# Patient Record
Sex: FEMALE | Race: WHITE | Employment: UNEMPLOYED | URBAN - METROPOLITAN AREA
[De-identification: names, ages, dates, MRNs, and addresses within clinical notes are randomized per-mention and may not be internally consistent; named-entity substitution may affect disease eponyms.]

---

## 2020-08-13 ENCOUNTER — HOSPITAL ENCOUNTER (EMERGENCY)
Facility: CLINIC | Age: 35
Discharge: HOME | End: 2020-08-14
Attending: EMERGENCY MEDICINE | Admitting: EMERGENCY MEDICINE
Payer: COMMERCIAL

## 2020-08-13 DIAGNOSIS — M62.81 GENERALIZED MUSCLE WEAKNESS: ICD-10-CM

## 2020-08-13 DIAGNOSIS — R06.02 SHORTNESS OF BREATH: ICD-10-CM

## 2020-08-13 DIAGNOSIS — Z20.822 SUSPECTED COVID-19 VIRUS INFECTION: ICD-10-CM

## 2020-08-13 DIAGNOSIS — R00.2 PALPITATIONS: ICD-10-CM

## 2020-08-13 LAB
BASOPHILS # BLD AUTO: 0.1 10E9/L (ref 0–0.2)
BASOPHILS NFR BLD AUTO: 0.5 %
DIFFERENTIAL METHOD BLD: ABNORMAL
EOSINOPHIL # BLD AUTO: 0.3 10E9/L (ref 0–0.7)
EOSINOPHIL NFR BLD AUTO: 2.3 %
ERYTHROCYTE [DISTWIDTH] IN BLOOD BY AUTOMATED COUNT: 13 % (ref 10–15)
HCT VFR BLD AUTO: 42.7 % (ref 35–47)
HGB BLD-MCNC: 13.9 G/DL (ref 11.7–15.7)
IMM GRANULOCYTES # BLD: 0 10E9/L (ref 0–0.4)
IMM GRANULOCYTES NFR BLD: 0.3 %
LYMPHOCYTES # BLD AUTO: 1.8 10E9/L (ref 0.8–5.3)
LYMPHOCYTES NFR BLD AUTO: 15.1 %
MCH RBC QN AUTO: 30.1 PG (ref 26.5–33)
MCHC RBC AUTO-ENTMCNC: 32.6 G/DL (ref 31.5–36.5)
MCV RBC AUTO: 92 FL (ref 78–100)
MONOCYTES # BLD AUTO: 0.7 10E9/L (ref 0–1.3)
MONOCYTES NFR BLD AUTO: 6.2 %
NEUTROPHILS # BLD AUTO: 8.8 10E9/L (ref 1.6–8.3)
NEUTROPHILS NFR BLD AUTO: 75.6 %
NRBC # BLD AUTO: 0 10*3/UL
NRBC BLD AUTO-RTO: 0 /100
PLATELET # BLD AUTO: 277 10E9/L (ref 150–450)
RBC # BLD AUTO: 4.62 10E12/L (ref 3.8–5.2)
WBC # BLD AUTO: 11.7 10E9/L (ref 4–11)

## 2020-08-13 PROCEDURE — 85025 COMPLETE CBC W/AUTO DIFF WBC: CPT | Performed by: EMERGENCY MEDICINE

## 2020-08-13 PROCEDURE — 85379 FIBRIN DEGRADATION QUANT: CPT | Performed by: EMERGENCY MEDICINE

## 2020-08-13 PROCEDURE — C9803 HOPD COVID-19 SPEC COLLECT: HCPCS

## 2020-08-13 PROCEDURE — 84703 CHORIONIC GONADOTROPIN ASSAY: CPT | Performed by: EMERGENCY MEDICINE

## 2020-08-13 PROCEDURE — 84484 ASSAY OF TROPONIN QUANT: CPT | Performed by: EMERGENCY MEDICINE

## 2020-08-13 PROCEDURE — 96374 THER/PROPH/DIAG INJ IV PUSH: CPT

## 2020-08-13 PROCEDURE — 80076 HEPATIC FUNCTION PANEL: CPT | Performed by: EMERGENCY MEDICINE

## 2020-08-13 PROCEDURE — 99285 EMERGENCY DEPT VISIT HI MDM: CPT | Mod: 25

## 2020-08-13 PROCEDURE — 80048 BASIC METABOLIC PNL TOTAL CA: CPT | Performed by: EMERGENCY MEDICINE

## 2020-08-13 PROCEDURE — 80320 DRUG SCREEN QUANTALCOHOLS: CPT | Performed by: EMERGENCY MEDICINE

## 2020-08-13 PROCEDURE — 93005 ELECTROCARDIOGRAM TRACING: CPT

## 2020-08-13 PROCEDURE — 96361 HYDRATE IV INFUSION ADD-ON: CPT

## 2020-08-13 PROCEDURE — 80329 ANALGESICS NON-OPIOID 1 OR 2: CPT | Performed by: EMERGENCY MEDICINE

## 2020-08-13 NOTE — ED AVS SNAPSHOT
[facility] Emergency Department  [address]  [facility] [address]  Phone:  [phone]  Fax:  [phone]                                    Kaykay Ndiaye   MRN: [id number]    Department:  [facility] Emergency Department   Date of Visit:  8/13/2020           After Visit Summary Signature Page    I have received my discharge instructions, and my questions have been answered. I have discussed any challenges I see with this plan with the nurse or doctor.    ..........................................................................................................................................  Patient/Patient Representative Signature      ..........................................................................................................................................  Patient Representative Print Name and Relationship to Patient    ..................................................               ................................................  Date                                   Time    ..........................................................................................................................................  Reviewed by Signature/Title    ...................................................              ..............................................  Date                                               Time          22EPIC Rev 08/18

## 2020-08-14 ENCOUNTER — APPOINTMENT (OUTPATIENT)
Dept: GENERAL RADIOLOGY | Facility: CLINIC | Age: 35
End: 2020-08-14
Attending: EMERGENCY MEDICINE
Payer: COMMERCIAL

## 2020-08-14 VITALS
RESPIRATION RATE: 18 BRPM | TEMPERATURE: 98.2 F | HEART RATE: 94 BPM | OXYGEN SATURATION: 99 % | SYSTOLIC BLOOD PRESSURE: 138 MMHG | DIASTOLIC BLOOD PRESSURE: 77 MMHG

## 2020-08-14 LAB
ALBUMIN SERPL-MCNC: 3.9 G/DL (ref 3.4–5)
ALBUMIN UR-MCNC: NEGATIVE MG/DL
ALP SERPL-CCNC: 98 U/L (ref 40–150)
ALT SERPL W P-5'-P-CCNC: 44 U/L (ref 0–50)
ANION GAP SERPL CALCULATED.3IONS-SCNC: 6 MMOL/L (ref 3–14)
APPEARANCE UR: CLEAR
AST SERPL W P-5'-P-CCNC: 32 U/L (ref 0–45)
BACTERIA #/AREA URNS HPF: ABNORMAL /HPF
BILIRUB DIRECT SERPL-MCNC: <0.1 MG/DL (ref 0–0.2)
BILIRUB SERPL-MCNC: 0.4 MG/DL (ref 0.2–1.3)
BILIRUB UR QL STRIP: NEGATIVE
BUN SERPL-MCNC: 13 MG/DL (ref 7–30)
CALCIUM SERPL-MCNC: 8.4 MG/DL (ref 8.5–10.1)
CHLORIDE SERPL-SCNC: 107 MMOL/L (ref 94–109)
CO2 SERPL-SCNC: 23 MMOL/L (ref 20–32)
COLOR UR AUTO: ABNORMAL
CREAT SERPL-MCNC: 0.56 MG/DL (ref 0.52–1.04)
D DIMER PPP FEU-MCNC: 0.3 UG/ML FEU (ref 0–0.5)
ETHANOL SERPL-MCNC: <0.01 G/DL
GFR SERPL CREATININE-BSD FRML MDRD: >90 ML/MIN/{1.73_M2}
GLUCOSE SERPL-MCNC: 105 MG/DL (ref 70–99)
GLUCOSE UR STRIP-MCNC: NEGATIVE MG/DL
HCG SERPL QL: NEGATIVE
HGB UR QL STRIP: ABNORMAL
INTERPRETATION ECG - MUSE: NORMAL
KETONES UR STRIP-MCNC: NEGATIVE MG/DL
LEUKOCYTE ESTERASE UR QL STRIP: ABNORMAL
MUCOUS THREADS #/AREA URNS LPF: PRESENT /LPF
NITRATE UR QL: NEGATIVE
PH UR STRIP: 5.5 PH (ref 5–7)
POTASSIUM SERPL-SCNC: 4.2 MMOL/L (ref 3.4–5.3)
PROT SERPL-MCNC: 7.2 G/DL (ref 6.8–8.8)
RBC #/AREA URNS AUTO: <1 /HPF (ref 0–2)
SALICYLATES SERPL-MCNC: <2 MG/DL
SODIUM SERPL-SCNC: 136 MMOL/L (ref 133–144)
SOURCE: ABNORMAL
SP GR UR STRIP: 1.02 (ref 1–1.03)
SQUAMOUS #/AREA URNS AUTO: 2 /HPF (ref 0–1)
TROPONIN I SERPL-MCNC: <0.015 UG/L (ref 0–0.04)
UROBILINOGEN UR STRIP-MCNC: NORMAL MG/DL (ref 0–2)
WBC #/AREA URNS AUTO: 1 /HPF (ref 0–5)

## 2020-08-14 PROCEDURE — 25800030 ZZH RX IP 258 OP 636: Performed by: EMERGENCY MEDICINE

## 2020-08-14 PROCEDURE — 81001 URINALYSIS AUTO W/SCOPE: CPT | Performed by: EMERGENCY MEDICINE

## 2020-08-14 PROCEDURE — 96361 HYDRATE IV INFUSION ADD-ON: CPT

## 2020-08-14 PROCEDURE — 25000128 H RX IP 250 OP 636: Performed by: EMERGENCY MEDICINE

## 2020-08-14 PROCEDURE — 71045 X-RAY EXAM CHEST 1 VIEW: CPT

## 2020-08-14 PROCEDURE — 96374 THER/PROPH/DIAG INJ IV PUSH: CPT

## 2020-08-14 PROCEDURE — U0003 INFECTIOUS AGENT DETECTION BY NUCLEIC ACID (DNA OR RNA); SEVERE ACUTE RESPIRATORY SYNDROME CORONAVIRUS 2 (SARS-COV-2) (CORONAVIRUS DISEASE [COVID-19]), AMPLIFIED PROBE TECHNIQUE, MAKING USE OF HIGH THROUGHPUT TECHNOLOGIES AS DESCRIBED BY CMS-2020-01-R: HCPCS | Performed by: EMERGENCY MEDICINE

## 2020-08-14 RX ORDER — LORAZEPAM 2 MG/ML
0.5 INJECTION INTRAMUSCULAR ONCE
Status: COMPLETED | OUTPATIENT
Start: 2020-08-14 | End: 2020-08-14

## 2020-08-14 RX ADMIN — SODIUM CHLORIDE 1000 ML: 9 INJECTION, SOLUTION INTRAVENOUS at 00:25

## 2020-08-14 RX ADMIN — LORAZEPAM 0.5 MG: 2 INJECTION INTRAMUSCULAR; INTRAVENOUS at 00:25

## 2020-08-14 RX ADMIN — SODIUM CHLORIDE 1000 ML: 9 INJECTION, SOLUTION INTRAVENOUS at 02:21

## 2020-08-14 ASSESSMENT — ENCOUNTER SYMPTOMS
DIFFICULTY URINATING: 0
DYSURIA: 0
DIARRHEA: 0
FATIGUE: 1
ABDOMINAL PAIN: 0
VOMITING: 0
NUMBNESS: 1
PALPITATIONS: 1
CHILLS: 1
COUGH: 1
FREQUENCY: 1
WEAKNESS: 1
HEADACHES: 1

## 2020-08-14 NOTE — DISCHARGE INSTRUCTIONS
Please monitor your symptoms closely    Push fluids to ensure hydration, separate your self from others, as we are awaiting results of COVID testing    Return to the ER with worsening shortness of breath, chest discomfort, fainting, fevers, or any other concerns.    Discharge Instructions  COVID-19    Your Provider has determined that you should practice self-isolation and self-monitoring in order to protect yourself and your community from COVID-19, which is the disease caused by a new coronavirus. The virus spreads from person to person primarily by droplets when an infected person coughs or sneezes and the droplet either lands on another person or that other person touches a surface with the droplet on it. Diagnosis of COVID-19 can be made with a test but many times the test is unavailable or not necessary. There is no specific treatment or medicine for the disease.    Symptoms of COVID-19    Many people have no symptoms or mild symptoms.  Symptoms may usually appear 4 to 5 days (up to 14 days) after contact with another ill person. Some people will get severe symptoms and pneumonia. Usual symptoms are:     ? Fever  ? Cough  ? Trouble breathing  ? Less common symptoms are: Headache, body aches, sore throat, sneezing, diarrhea, loss of taste or smell.    How to Care for Yourself Stay home.      Most people will recover from illness with mild symptoms.  Isolation by staying home is the best method to prevent the spread of the illness. Do not go to work or school. Have a friend or relative do your shopping. Do not use public transportation (bus, train) or ridesharing (Lyft, Uber).    How long should I stay home?    If you have symptoms of COVID, you should stay home for at least 10 days, and for 24 hours with no fever and improvement of symptoms--whichever is longer. (Your fever should be gone for 24 hours without using fever-reducing medicine)  For example, if you have a fever and cough for 6 days, you need to stay  home 4 more days with no fever for a total of 10 days. Or, if you have a fever and cough for 10 days, you need to stay home one more day with no fever for a total of 11 days.    Separate yourself from other people in your home.?As much as possible, you should stay in one room and away from other people in your home. Also, use a separate bathroom, if possible. Avoid handling pets or other animals while sick.     Wear a facemask if you need to be around other people and cover your mouth and nose with a tissue when you cough or sneeze.     Avoid sharing personal household items. You should not share dishes, drinking glasses, forks/knives/spoons, towels, or bedding with other people in your home. After using these items, they should be washed with soap and water. Clean parts of your home that are touched often (doorknobs, faucets, countertops, etc.) daily.     Wash your hands often with soap and water for at least 20 seconds or use an alcohol-based hand  containing at least 60% alcohol.     Avoid touching your face.    Treat your symptoms. You can take Acetaminophen (Tylenol) to treat body aches and fever as needed for comfort. Ibuprofen (Advil or Motrin) can be used as well if you still have symptoms after taking Tylenol. Drink fluids. Rest.    Watch for worsening symptoms such as shortness of breath/difficulty breathing or very severe weakness.    Employers/workplaces are being asked by the Centers for Disease Control (CDC) to not request notes/documentation for you to return to work or prove that you were ill. You may choose to show your employer this paperwork. Also, repeat testing should not be required to return to work.    Return to the Emergency Department if:    If you are developing worsening breathing, shortness of breath, or feel worse you should seek medical attention.  If you are uncertain, contact your health care provider/clinic. If you need emergency medical attention, call 911 and tell them  you have been ill.

## 2020-08-14 NOTE — ED PROVIDER NOTES
"  History     Chief Complaint:  Palpitations    The history is provided by the patient.      Kaykay Ndiaye is a 34 year old female who presents with palpitations that began today. The patient recently flew here from [place] to visit her mother, and had a few shots last night. She did not say how many. She felt \"off\" when she awoke this morning, noting weakness, headaches, and generalized fatigue. She thinks this was likely a hangover. She denies having had any withdrawal symptoms. The patient notes that her chest is not painful, but rather feels like she is having palpitations and her heart was racing. She did take 2 aspirin this morning and another two later on in the day, dose unknown. She has also tried taking deeper breaths to see if it would improve her symptoms. Tonight, both of her arms started becoming numb, and she came to the ED as her mother was concerned. Here, the patient endorses chills and intermittent cough, but denies any fevers, abdominal pain, vomiting, or diarrhea. She also notes urinary frequency, but denies any pain or burning with urination. The patient has recently been starting to take loratadine, but denies any other regular medications. She denies any personal history of heart problems, but does confirm family history in father. She has no history of clots, but mentions having leg cramps to her left calf. She denies any known sick contacts or COVID-19 exposures. She was tested for COVID-19 two weeks ago, which came back negative.    Allergies:  No Known Drug Allergies    Medications:    Loratidine    Past Medical History:    Tobacco dependence    Past Surgical History:    Appendectomy   section    Family History:    Father: Hypertension  Mother: Thyroid disease    Social History:  The patient was not accompanied to the ED.  Smoking Status: Not on file  Smokeless Tobacco: Not on file  Alcohol Use: Yes  Marital Status: Not on file    Review of Systems   Constitutional: Positive for " chills and fatigue.   Respiratory: Positive for cough (Intermittent).    Cardiovascular: Positive for palpitations. Negative for chest pain.   Gastrointestinal: Negative for abdominal pain, diarrhea and vomiting.   Genitourinary: Positive for frequency. Negative for difficulty urinating, dysuria and urgency.   Neurological: Positive for weakness, numbness and headaches. Seizures: Bilateral arms.   All other systems reviewed and are negative.    Physical Exam     Patient Vitals for the past 24 hrs:   BP Temp Pulse Heart Rate Resp SpO2   08/14/20 0319 138/77 -- 94 94 18 99 %   08/14/20 0200 (!) 146/71 -- 112 112 -- 98 %   08/14/20 0145 -- -- -- 98 -- 100 %   08/14/20 0130 (!) 134/91 -- 101 100 -- 100 %   08/14/20 0115 130/84 -- 99 95 -- 100 %   08/14/20 0100 (!) 143/86 -- 105 110 -- 100 %   08/14/20 0045 (!) 145/95 -- 105 116 -- 100 %   08/14/20 0030 (!) 146/97 -- 98 112 -- 100 %   08/14/20 0015 (!) 153/101 -- 113 115 -- 100 %   08/14/20 0000 (!) 154/101 -- 101 96 -- 100 %   08/13/20 2345 (!) 143/96 -- -- -- -- --   08/13/20 2139 -- -- -- -- 20 100 %   08/13/20 2137 (!) 140/103 98.2  F (36.8  C) 138 -- 24 --       Physical Exam  General:   Well-nourished   Speaking in full sentences  Eyes:   Conjunctiva without injection or scleral icterus  ENT:   Moist mucous membranes   Nares patent   Pinnae normal  Neck:   Full ROM   No stiffness appreciated  Resp:   Lungs CTAB   No crackles, wheezing or audible rubs   Good air movement  CV:    Tachycardic rate, regular rhythm   S1 and S2 present   No murmur, gallop or rub  GI:   BS present   Abdomen soft without distention   Non-tender to light and deep palpation   No guarding or rebound tenderness  Skin:   Warm, dry, well perfused   Coining noted to posterior thorax  MSK:   Moves all extremities   No focal deformities or swelling   No calf asymmetry  Neuro:   Alert   Answers questions appropriately   Moves all extremities equally   Gait stable  Psych:   Normal affect, normal  mood    Emergency Department Course     ECG:  Indication: Palpitations  Completed at 2144.   Sinus rhythm  Possible Left atrial enlargement  Borderline ECG  No previous ECGs available   Rate 100 bpm. MI interval 146. QRS duration 82. QT/QTc 346/446. P-R-T axes 67 82 37.  Agree with computer interpretation.    Imaging:  Radiology findings were communicated with the patient who voiced understanding of the findings.    XR Chest Port 1 View:  Negative chest.  Report per radiology.    Laboratory:  Laboratory findings were communicated with the patient who voiced understanding of the findings.    CBC: WBC 11.7 (H), HGB 13.9,   BMP: Glucose 105 (H), Calcium 8.4 (L), o/w WNL (Creatinine 0.56)    Hepatic panel: AWNL    D dimer: 0.3  (2338) Troponin: <0.015    Alcohol ethyl: <0.01    Salicylate level: <2    HCG Qualitative: negative    UA with micro: Trace blood, Trace Leukocyte Esterase, Few bacteria, Squamous Epithelial/HPF 2 (H), Mucous present, o/w negative    Urine Culture Aerobic Bacterial: Pending    COVID-19 Virus (Coronavirus) by Nasopharyngeal (NP) Swab: Pending    Interventions:  0025 NS 1L IV Bolus  0025 Ativan 0.5 mg IV    Emergency Department Course:  Past medical records, nursing notes, and vitals reviewed.    2357 I performed an exam of the patient as documented above.     IV was inserted and blood was drawn for laboratory testing, results above.    The patient was swabbed for COVID-19, noted above.    The patient provided a urine sample here in the emergency department. This was sent for laboratory testing, findings above.    The patient had a chest X-ray while in the emergency department, results above.     0300 I rechecked the patient and discussed the results of her workup thus far.     Findings and plan explained to the Patient. Patient discharged home with instructions regarding supportive care, medications, and reasons to return. The importance of close follow-up was reviewed.    I personally  reviewed the laboratory and imaging results with the Patient and answered all related questions prior to discharge.     Impression & Plan     Covid-19  Kaykay Ndiaye was evaluated during a global COVID-19 pandemic, which necessitated consideration that the patient might be at risk for infection with the SARS-CoV-2 virus that causes COVID-19.   Applicable protocols for evaluation were followed during the patient's care.   COVID-19 was considered as part of the patient's evaluation. A test was obtained during this visit.    Medical Decision Making:  Kaykay Ndiaye is a 34-year-old female presenting to the ED for evaluation of palpitations, and generalized weakness for the past day.  VS on presentation reveal elevated HR and BP, both of which improved during patient's ED course.  A broad differential was considered, including though was not limited to, infection, dehydration, withdrawal, cardiac arrhythmia, PE, ACS, illicit drug use, among others.  At this time, the precise etiology for her symptoms remains somewhat unclear.  Patient does describe heavy alcohol use yesterday evening, certainly which may have contributed to her malaise and palpitations as the day progressed.  She was provided IV fluids, and 1 dose of Ativan, with improvements in symptoms. Current ethanol level is undetectable.  She denies any other illicit drug use to this writer.  She does describe therapeutic use of aspirin over the course of the day, though aspirin level currently remains undetectable, and I feel salicylate toxicity unlikely to account for her symptoms.  EKG demonstrates sinus rhythm no evidence of acute dysrhythmia nor prolonged QTC.  She denies chest pain or chest pressure, and I feel current presentation unlikely to represent ACS.  Troponin returned negative.  In light of her recent travel, presenting tachycardia, and shortness of breath, PE was also considered though felt to be unlikely.  Overall, I do feel her to be low probability for  PE, and given normal d-dimer, do feel CT chest can be deferred safely.  In light of her recent travel, COVID-19 certainly on the differential.  Testing will be sent from the ED.  No other signs or symptoms consistent with acute infectious process identified at this time.  Specifically, chest x-ray negative for pneumonia and UA negative for infection.  For signs or symptoms consistent with meningitis nor encephalitis.  At this time with reasonable clinical certainty I feel she is appropriate and stable for discharge from the rest, fluids duration, and separation/quarantine given testing for COVID.  Patient understanding of the isolation protocols as discussed.  She is welcome to return to the ED at any point with new or troubling symptoms such as recurrent palpitations, dizziness, syncope, shortness of breath, or any other concerns.  All questions are answered prior to discharge.    Diagnosis:    ICD-10-CM    1. Generalized muscle weakness  M62.81    2. Palpitations  R00.2    3. Shortness of breath  R06.02    4. Suspected COVID-19 virus infection  Z20.828        Disposition:  Discharged to home.    Discharge Medications:  None.      Scribe Disclosure:  I, Zainab Salcido, am serving as a scribe at 11:57 PM on 8/13/2020 to document services personally performed by Antonio Almanza MD based on my observations and the provider's statements to me.     Zainab Salcido  8/13/2020   [facility] EMERGENCY DEPARTMENT       Antonio Almanza MD  08/14/20 0614

## 2020-08-15 LAB
SARS-COV-2 RNA SPEC QL NAA+PROBE: NOT DETECTED
SPECIMEN SOURCE: NORMAL

## 2021-01-04 ENCOUNTER — HEALTH MAINTENANCE LETTER (OUTPATIENT)
Age: 36
End: 2021-01-04

## 2021-10-11 ENCOUNTER — HEALTH MAINTENANCE LETTER (OUTPATIENT)
Age: 36
End: 2021-10-11

## 2022-01-30 ENCOUNTER — HEALTH MAINTENANCE LETTER (OUTPATIENT)
Age: 37
End: 2022-01-30

## 2022-09-24 ENCOUNTER — HEALTH MAINTENANCE LETTER (OUTPATIENT)
Age: 37
End: 2022-09-24

## 2023-05-08 ENCOUNTER — HEALTH MAINTENANCE LETTER (OUTPATIENT)
Age: 38
End: 2023-05-08